# Patient Record
Sex: MALE | Race: OTHER | Employment: OTHER | URBAN - METROPOLITAN AREA
[De-identification: names, ages, dates, MRNs, and addresses within clinical notes are randomized per-mention and may not be internally consistent; named-entity substitution may affect disease eponyms.]

---

## 2019-05-13 ENCOUNTER — OFFICE VISIT (OUTPATIENT)
Dept: FAMILY MEDICINE CLINIC | Age: 49
End: 2019-05-13

## 2019-05-13 VITALS
OXYGEN SATURATION: 97 % | BODY MASS INDEX: 29.99 KG/M2 | HEART RATE: 73 BPM | TEMPERATURE: 97.3 F | WEIGHT: 180 LBS | DIASTOLIC BLOOD PRESSURE: 70 MMHG | HEIGHT: 65 IN | SYSTOLIC BLOOD PRESSURE: 124 MMHG

## 2019-05-13 DIAGNOSIS — J02.9 ACUTE PHARYNGITIS, UNSPECIFIED ETIOLOGY: Primary | ICD-10-CM

## 2019-05-13 PROCEDURE — 99213 OFFICE O/P EST LOW 20 MIN: CPT | Performed by: FAMILY MEDICINE

## 2019-05-13 RX ORDER — AZITHROMYCIN 250 MG/1
TABLET, FILM COATED ORAL
Qty: 6 TABLET | Refills: 0 | Status: SHIPPED | OUTPATIENT
Start: 2019-05-13 | End: 2019-05-18

## 2019-05-13 RX ORDER — METHYLPREDNISOLONE 4 MG/1
TABLET ORAL
Qty: 1 KIT | Refills: 0 | Status: SHIPPED | OUTPATIENT
Start: 2019-05-13 | End: 2019-05-19
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
## 2020-10-28 ENCOUNTER — OFFICE VISIT (OUTPATIENT)
Dept: FAMILY MEDICINE CLINIC | Age: 50
End: 2020-10-28
Payer: MEDICAID

## 2020-10-28 VITALS
WEIGHT: 182 LBS | RESPIRATION RATE: 18 BRPM | TEMPERATURE: 97.5 F | OXYGEN SATURATION: 99 % | HEART RATE: 75 BPM | DIASTOLIC BLOOD PRESSURE: 82 MMHG | SYSTOLIC BLOOD PRESSURE: 134 MMHG | BODY MASS INDEX: 30.32 KG/M2 | HEIGHT: 65 IN

## 2020-10-28 PROCEDURE — 1036F TOBACCO NON-USER: CPT | Performed by: PHYSICIAN ASSISTANT

## 2020-10-28 PROCEDURE — G8427 DOCREV CUR MEDS BY ELIG CLIN: HCPCS | Performed by: PHYSICIAN ASSISTANT

## 2020-10-28 PROCEDURE — G8484 FLU IMMUNIZE NO ADMIN: HCPCS | Performed by: PHYSICIAN ASSISTANT

## 2020-10-28 PROCEDURE — 99213 OFFICE O/P EST LOW 20 MIN: CPT | Performed by: PHYSICIAN ASSISTANT

## 2020-10-28 PROCEDURE — G8417 CALC BMI ABV UP PARAM F/U: HCPCS | Performed by: PHYSICIAN ASSISTANT

## 2020-10-28 RX ORDER — CHLORPHENIRAMINE MALEATE 4 MG/1
4 TABLET ORAL EVERY 6 HOURS PRN
Qty: 20 TABLET | Refills: 0 | Status: SHIPPED
Start: 2020-10-28 | End: 2021-05-13 | Stop reason: ALTCHOICE

## 2020-10-28 RX ORDER — METHYLPREDNISOLONE 4 MG/1
TABLET ORAL
Qty: 1 KIT | Refills: 0 | Status: SHIPPED
Start: 2020-10-28 | End: 2021-05-13 | Stop reason: ALTCHOICE

## 2020-10-28 RX ORDER — AZITHROMYCIN 250 MG/1
250 TABLET, FILM COATED ORAL SEE ADMIN INSTRUCTIONS
Qty: 6 TABLET | Refills: 0 | Status: SHIPPED | OUTPATIENT
Start: 2020-10-28 | End: 2020-11-02

## 2020-10-28 NOTE — PROGRESS NOTES
10/28/20  Paulie Peña : 1970 Sex: male  Age 52 y.o. Subjective:  Chief Complaint   Patient presents with    Cough     cough and congestion for 4 days          HPI:   Davina Beth , 52 y.o. male presents to express care for evaluation of cough, congestion, drainage. The patient has had these symptoms ongoing for last 4 days. The patient is not having any chest pain, shortness of breath, back pain or flank pain. Patient does not really have any significant sputum production. The patient states that he was taking some DayQuil with minimal improvement of the symptoms. The patient has not had any chest pain, shortness of breath. The patient denies any lack of smell or taste. No fevers. He lives alone and no other sick contacts at home. ROS:   Unless otherwise stated in this report the patient's positive and negative responses for review of systems for constitutional, eyes, ENT, cardiovascular, respiratory, gastrointestinal, neurological, , musculoskeletal, and integument systems and related systems to the presenting problem are either stated in the history of present illness or were not pertinent or were negative for the symptoms and/or complaints related to the presenting medical problem. Positives and pertinent negatives as per HPI. All others reviewed and are negative. PMH:   No past medical history on file. No past surgical history on file. No family history on file. Medications:   No current outpatient medications on file. Allergies: Allergies   Allergen Reactions    Percocet [Oxycodone-Acetaminophen] Itching and Nausea Only       Social History:     Social History     Tobacco Use    Smoking status: Never Smoker    Smokeless tobacco: Never Used   Substance Use Topics    Alcohol use: No    Drug use: No       Patient lives at home.     Physical Exam:     Vitals:    10/28/20 1110   BP: 134/82   Pulse: 75   Resp: 18   Temp: 97.5 °F (36.4 °C)   SpO2: 99%   Weight: 182 lb (82.6 kg)   Height: 5' 5\" (1.651 m)       Exam:  Physical Exam  Nurse's notes and vital signs reviewed. The patient is not hypoxic. ? General: Alert, no acute distress, patient resting comfortably Patient is not toxic or lethargic. Skin: Warm, intact, no pallor noted. There is no evidence of rash at this time. Head: Normocephalic, atraumatic  Eye: Normal conjunctiva  Ears, Nose, Throat: Right tympanic membrane clear, left tympanic membrane clear. No drainage or discharge noted. No pre- or post-auricular tenderness, erythema, or swelling noted. Nasal congestion and rhinorrhea  Posterior oropharynx shows erythema but no tonsillar hypertrophy, or exudate. the uvula is midline. No trismus or drooling is noted. Moist mucous membranes. Neck: No anterior/posterior lymphadenopathy noted. No erythema, no masses, no fluctuance or induration noted. No meningeal signs. Cardiovascular: Regular Rate and Rhythm  Respiratory: No acute distress, no rhonchi, wheezing or crackles noted. No stridor or retractions are noted. Neurological: A&O x4, normal speech  Psychiatric: Cooperative         Testing:           Medical Decision Making:     The patient has been seen and evaluated. The vital signs have been reviewed. The patient does not appear to be toxic or lethargic. The patient will be treated with a azithromycin, chlorphentermine and Medrol Dosepak. The patient was educated on the proper dosage of motrin and tylenol and the appropriate intervals of each. The patient is to increase fluid intake over the next several days. The patient is to use OTC nasal spray    The patient is to return to express care or go directly to the emergency department should any of the signs or symptoms worsen. The patient is to followup with primary care physician in 2-3 days for repeat evaluation. The patient has no other questions or concerns at this time the patient will be discharged home.           Clinical Impression:   Rodolfo Arellano was seen today for cough. Diagnoses and all orders for this visit:    Acute non-recurrent sinusitis, unspecified location  -     azithromycin (ZITHROMAX) 250 MG tablet; Take 1 tablet by mouth See Admin Instructions for 5 days 500mg on day 1 followed by 250mg on days 2 - 5    Acute upper respiratory infection, unspecified    Cough    Nasal congestion    Other orders  -     methylPREDNISolone (MEDROL DOSEPACK) 4 MG tablet; Take by mouth.  -     chlorpheniramine (ALLER-CHLOR) 4 MG tablet; Take 1 tablet by mouth every 6 hours as needed for Allergies        The patient is to call for any concerns or return if any of the signs or symptoms worsen. The patient is to follow-up with PCP in the next 2-3 days for repeat evaluation repeat assessment or go directly to the emergency department.      SIGNATURE: Brett Winkler III, PA-C

## 2021-05-13 ENCOUNTER — OFFICE VISIT (OUTPATIENT)
Dept: FAMILY MEDICINE CLINIC | Age: 51
End: 2021-05-13
Payer: MEDICAID

## 2021-05-13 VITALS
DIASTOLIC BLOOD PRESSURE: 82 MMHG | BODY MASS INDEX: 33.31 KG/M2 | HEART RATE: 63 BPM | HEIGHT: 62 IN | SYSTOLIC BLOOD PRESSURE: 132 MMHG | RESPIRATION RATE: 18 BRPM | TEMPERATURE: 97.5 F | OXYGEN SATURATION: 98 % | WEIGHT: 181 LBS

## 2021-05-13 DIAGNOSIS — R07.0 PAIN IN THROAT: ICD-10-CM

## 2021-05-13 DIAGNOSIS — R09.82 POSTNASAL DRIP: ICD-10-CM

## 2021-05-13 DIAGNOSIS — H66.92 LEFT OTITIS MEDIA, UNSPECIFIED OTITIS MEDIA TYPE: ICD-10-CM

## 2021-05-13 DIAGNOSIS — J01.90 ACUTE NON-RECURRENT SINUSITIS, UNSPECIFIED LOCATION: Primary | ICD-10-CM

## 2021-05-13 PROCEDURE — G8417 CALC BMI ABV UP PARAM F/U: HCPCS | Performed by: PHYSICIAN ASSISTANT

## 2021-05-13 PROCEDURE — 99213 OFFICE O/P EST LOW 20 MIN: CPT | Performed by: PHYSICIAN ASSISTANT

## 2021-05-13 PROCEDURE — G8427 DOCREV CUR MEDS BY ELIG CLIN: HCPCS | Performed by: PHYSICIAN ASSISTANT

## 2021-05-13 PROCEDURE — 1036F TOBACCO NON-USER: CPT | Performed by: PHYSICIAN ASSISTANT

## 2021-05-13 RX ORDER — PREDNISONE 10 MG/1
TABLET ORAL
Qty: 18 TABLET | Refills: 0 | Status: SHIPPED
Start: 2021-05-13 | End: 2021-06-25 | Stop reason: ALTCHOICE

## 2021-05-13 RX ORDER — AMOXICILLIN 875 MG/1
875 TABLET, COATED ORAL 2 TIMES DAILY
Qty: 20 TABLET | Refills: 0 | Status: SHIPPED | OUTPATIENT
Start: 2021-05-13 | End: 2021-05-23

## 2021-05-13 NOTE — PROGRESS NOTES
21  Paulie Peña : 1970 Sex: male  Age 54 y.o. Subjective:  Chief Complaint   Patient presents with    Other     pt states sore throat and ear after eating chicken last night          HPI:   Emilie Negrete , 54 y.o. male presents to express care for evaluation of sore throat, left ear pain. The patient started with the symptoms essentially last night after eating some chicken and had a sauce on it. The patient states that this not uncommon to have some kind of allergic reaction to different types of sauce. The patient cannot explain the ear pain. The patient states that he has had some congestion. The patient did not have any fevers. No right ear pain. No syncope. The patient has no pulsatile sensation. The patient denies any other complaints currently. ROS:   Unless otherwise stated in this report the patient's positive and negative responses for review of systems for constitutional, eyes, ENT, cardiovascular, respiratory, gastrointestinal, neurological, , musculoskeletal, and integument systems and related systems to the presenting problem are either stated in the history of present illness or were not pertinent or were negative for the symptoms and/or complaints related to the presenting medical problem. Positives and pertinent negatives as per HPI. All others reviewed and are negative. PMH:   History reviewed. No pertinent past medical history. History reviewed. No pertinent surgical history. History reviewed. No pertinent family history. Medications:     Current Outpatient Medications:     amoxicillin (AMOXIL) 875 MG tablet, Take 1 tablet by mouth 2 times daily for 10 days, Disp: 20 tablet, Rfl: 0    predniSONE (DELTASONE) 10 MG tablet, 3 tabs once daily for 3 days, 2 tabs once daily for 3 days, 1 tab once daily for 3 days, Disp: 18 tablet, Rfl: 0    Allergies:      Allergies   Allergen Reactions    Percocet [Oxycodone-Acetaminophen] Itching and

## 2021-06-25 ENCOUNTER — OFFICE VISIT (OUTPATIENT)
Dept: FAMILY MEDICINE CLINIC | Age: 51
End: 2021-06-25
Payer: MEDICAID

## 2021-06-25 VITALS
DIASTOLIC BLOOD PRESSURE: 76 MMHG | TEMPERATURE: 97.7 F | RESPIRATION RATE: 16 BRPM | HEIGHT: 65 IN | WEIGHT: 181 LBS | SYSTOLIC BLOOD PRESSURE: 126 MMHG | OXYGEN SATURATION: 99 % | BODY MASS INDEX: 30.16 KG/M2 | HEART RATE: 94 BPM

## 2021-06-25 DIAGNOSIS — H66.92 LEFT OTITIS MEDIA, UNSPECIFIED OTITIS MEDIA TYPE: Primary | ICD-10-CM

## 2021-06-25 DIAGNOSIS — R59.0 CERVICAL LYMPHADENOPATHY: ICD-10-CM

## 2021-06-25 PROCEDURE — 1036F TOBACCO NON-USER: CPT | Performed by: PHYSICIAN ASSISTANT

## 2021-06-25 PROCEDURE — 99213 OFFICE O/P EST LOW 20 MIN: CPT | Performed by: PHYSICIAN ASSISTANT

## 2021-06-25 PROCEDURE — G8427 DOCREV CUR MEDS BY ELIG CLIN: HCPCS | Performed by: PHYSICIAN ASSISTANT

## 2021-06-25 PROCEDURE — G8417 CALC BMI ABV UP PARAM F/U: HCPCS | Performed by: PHYSICIAN ASSISTANT

## 2021-06-25 RX ORDER — CEFDINIR 300 MG/1
300 CAPSULE ORAL 2 TIMES DAILY
Qty: 20 CAPSULE | Refills: 0 | Status: SHIPPED
Start: 2021-06-25 | End: 2021-07-02

## 2021-06-25 RX ORDER — PREDNISONE 10 MG/1
TABLET ORAL
Qty: 18 TABLET | Refills: 0 | Status: SHIPPED
Start: 2021-06-25 | End: 2021-07-02

## 2021-06-25 NOTE — PROGRESS NOTES
Social History:     Social History     Tobacco Use    Smoking status: Never Smoker    Smokeless tobacco: Never Used   Substance Use Topics    Alcohol use: No    Drug use: No       Patient lives at home. Physical Exam:     Vitals:    06/25/21 1408   BP: 126/76   Pulse: 94   Resp: 16   Temp: 97.7 °F (36.5 °C)   SpO2: 99%   Weight: 181 lb (82.1 kg)   Height: 5' 5\" (1.651 m)       Exam:  Physical Exam  Nurse's notes and vital signs reviewed. The patient is not hypoxic. ? General: Alert, no acute distress, patient resting comfortably Patient is not toxic or lethargic. Skin: Warm, intact, no pallor noted. There is no evidence of rash at this time. Head: Normocephalic, atraumatic  Eye: Normal conjunctiva  Ears, Nose, Throat: Right tympanic membrane clear, left tympanic membrane erythematous and bulging. No drainage or discharge noted. No pre- or post-auricular tenderness, erythema, or swelling noted. Slight congestion  Posterior oropharynx shows no erythema, tonsillar hypertrophy, or exudate. the uvula is midline. No trismus or drooling is noted. Moist mucous membranes. Neck: Mild left anterior lymphadenopathy. No erythema, no masses, no fluctuance or induration noted. No meningeal signs. Cardiovascular: Regular Rate and Rhythm  Respiratory: No acute distress, no rhonchi, wheezing or crackles noted. No stridor or retractions are noted. Neurological: A&O x4, normal speech  Psychiatric: Cooperative         Testing:           Medical Decision Making:     Vital signs reviewed    Past medical history reviewed. Allergies reviewed. Medications reviewed. Patient on arrival does not appear to be in any apparent distress or discomfort. The patient has been seen and evaluated. The patient does not appear to be toxic or lethargic. The patient will be treated with prednisone and Omnicef. The patient was educated on the proper dosage of motrin and tylenol and the appropriate intervals of each.  The patient is to increase fluid intake over the next several days. The patient is to use OTC decongestant as needed. The patient is to return to express care or go directly to the emergency department should any of the signs or symptoms worsen. The patient is to followup with primary care physician in 2-3 days for repeat evaluation. The patient has no other questions or concerns at this time the patient will be discharged home. Clinical Impression:   Sigrid Bonilla was seen today for ear problem. Diagnoses and all orders for this visit:    Left otitis media, unspecified otitis media type    Cervical lymphadenopathy    Other orders  -     predniSONE (DELTASONE) 10 MG tablet; 3 tabs once daily for 3 days, 2 tabs once daily for 3 days, 1 tab once daily for 3 days  -     cefdinir (OMNICEF) 300 MG capsule; Take 1 capsule by mouth 2 times daily for 10 days        The patient is to call for any concerns or return if any of the signs or symptoms worsen. The patient is to follow-up with PCP in the next 2-3 days for repeat evaluation repeat assessment or go directly to the emergency department.      SIGNATURE: Moira Strickland III, PA-C

## 2021-07-02 ENCOUNTER — OFFICE VISIT (OUTPATIENT)
Dept: FAMILY MEDICINE CLINIC | Age: 51
End: 2021-07-02
Payer: MEDICAID

## 2021-07-02 ENCOUNTER — APPOINTMENT (OUTPATIENT)
Dept: CT IMAGING | Age: 51
End: 2021-07-02
Payer: MEDICAID

## 2021-07-02 ENCOUNTER — HOSPITAL ENCOUNTER (EMERGENCY)
Age: 51
Discharge: HOME | End: 2021-07-02
Attending: EMERGENCY MEDICINE
Payer: MEDICAID

## 2021-07-02 VITALS
OXYGEN SATURATION: 99 % | HEART RATE: 80 BPM | HEIGHT: 65 IN | TEMPERATURE: 97.3 F | DIASTOLIC BLOOD PRESSURE: 83 MMHG | WEIGHT: 185 LBS | SYSTOLIC BLOOD PRESSURE: 124 MMHG | RESPIRATION RATE: 14 BRPM | BODY MASS INDEX: 30.82 KG/M2

## 2021-07-02 VITALS
DIASTOLIC BLOOD PRESSURE: 66 MMHG | OXYGEN SATURATION: 99 % | BODY MASS INDEX: 30.45 KG/M2 | TEMPERATURE: 96.8 F | SYSTOLIC BLOOD PRESSURE: 128 MMHG | WEIGHT: 183 LBS | HEART RATE: 72 BPM

## 2021-07-02 DIAGNOSIS — R22.1 LOCALIZED SWELLING, MASS AND LUMP, NECK: Primary | ICD-10-CM

## 2021-07-02 DIAGNOSIS — M54.2 NECK PAIN ON LEFT SIDE: Primary | ICD-10-CM

## 2021-07-02 LAB
ANION GAP SERPL CALCULATED.3IONS-SCNC: 10 MMOL/L (ref 7–16)
BASOPHILS ABSOLUTE: 0.02 E9/L (ref 0–0.2)
BASOPHILS RELATIVE PERCENT: 0.2 % (ref 0–2)
BUN BLDV-MCNC: 12 MG/DL (ref 6–20)
CALCIUM SERPL-MCNC: 9.5 MG/DL (ref 8.6–10.2)
CHLORIDE BLD-SCNC: 107 MMOL/L (ref 98–107)
CO2: 22 MMOL/L (ref 22–29)
CREAT SERPL-MCNC: 1.1 MG/DL (ref 0.7–1.2)
EOSINOPHILS ABSOLUTE: 0.04 E9/L (ref 0.05–0.5)
EOSINOPHILS RELATIVE PERCENT: 0.3 % (ref 0–6)
GFR AFRICAN AMERICAN: >60
GFR NON-AFRICAN AMERICAN: >60 ML/MIN/1.73
GLUCOSE BLD-MCNC: 100 MG/DL (ref 74–99)
HCT VFR BLD CALC: 41 % (ref 37–54)
HEMOGLOBIN: 13.9 G/DL (ref 12.5–16.5)
IMMATURE GRANULOCYTES #: 0.07 E9/L
IMMATURE GRANULOCYTES %: 0.6 % (ref 0–5)
LYMPHOCYTES ABSOLUTE: 3.43 E9/L (ref 1.5–4)
LYMPHOCYTES RELATIVE PERCENT: 28.7 % (ref 20–42)
MCH RBC QN AUTO: 29.1 PG (ref 26–35)
MCHC RBC AUTO-ENTMCNC: 33.9 % (ref 32–34.5)
MCV RBC AUTO: 86 FL (ref 80–99.9)
MONO TEST: NEGATIVE
MONOCYTES ABSOLUTE: 0.8 E9/L (ref 0.1–0.95)
MONOCYTES RELATIVE PERCENT: 6.7 % (ref 2–12)
NEUTROPHILS ABSOLUTE: 7.58 E9/L (ref 1.8–7.3)
NEUTROPHILS RELATIVE PERCENT: 63.5 % (ref 43–80)
PDW BLD-RTO: 13.2 FL (ref 11.5–15)
PLATELET # BLD: 221 E9/L (ref 130–450)
PMV BLD AUTO: 11.2 FL (ref 7–12)
POTASSIUM REFLEX MAGNESIUM: 3.7 MMOL/L (ref 3.5–5)
RBC # BLD: 4.77 E12/L (ref 3.8–5.8)
SODIUM BLD-SCNC: 139 MMOL/L (ref 132–146)
WBC # BLD: 11.9 E9/L (ref 4.5–11.5)

## 2021-07-02 PROCEDURE — 80048 BASIC METABOLIC PNL TOTAL CA: CPT

## 2021-07-02 PROCEDURE — 6360000004 HC RX CONTRAST MEDICATION: Performed by: RADIOLOGY

## 2021-07-02 PROCEDURE — 70491 CT SOFT TISSUE NECK W/DYE: CPT

## 2021-07-02 PROCEDURE — 99284 EMERGENCY DEPT VISIT MOD MDM: CPT

## 2021-07-02 PROCEDURE — 99213 OFFICE O/P EST LOW 20 MIN: CPT | Performed by: PHYSICIAN ASSISTANT

## 2021-07-02 PROCEDURE — 2580000003 HC RX 258: Performed by: NURSE PRACTITIONER

## 2021-07-02 PROCEDURE — 1036F TOBACCO NON-USER: CPT | Performed by: PHYSICIAN ASSISTANT

## 2021-07-02 PROCEDURE — G8417 CALC BMI ABV UP PARAM F/U: HCPCS | Performed by: PHYSICIAN ASSISTANT

## 2021-07-02 PROCEDURE — 86308 HETEROPHILE ANTIBODY SCREEN: CPT

## 2021-07-02 PROCEDURE — G8427 DOCREV CUR MEDS BY ELIG CLIN: HCPCS | Performed by: PHYSICIAN ASSISTANT

## 2021-07-02 PROCEDURE — 85025 COMPLETE CBC W/AUTO DIFF WBC: CPT

## 2021-07-02 RX ORDER — 0.9 % SODIUM CHLORIDE 0.9 %
1000 INTRAVENOUS SOLUTION INTRAVENOUS ONCE
Status: COMPLETED | OUTPATIENT
Start: 2021-07-02 | End: 2021-07-02

## 2021-07-02 RX ADMIN — IOPAMIDOL 75 ML: 755 INJECTION, SOLUTION INTRAVENOUS at 21:23

## 2021-07-02 RX ADMIN — SODIUM CHLORIDE 1000 ML: 9 INJECTION, SOLUTION INTRAVENOUS at 21:30

## 2021-07-02 ASSESSMENT — PAIN SCALES - GENERAL: PAINLEVEL_OUTOF10: 1

## 2021-07-02 NOTE — PROGRESS NOTES
21  Paulie Peña : 1970 Sex: male  Age 54 y.o. Subjective:  Chief Complaint   Patient presents with    Otalgia     L side          HPI:   Larry , 54 y.o. male presents to express care for evaluation of left-sided neck pain, swelling. The patient states that the left neck pain in swelling started essentially last night. The patient was seen evaluate about a week ago and was placed on cefdinir and prednisone. He finished those up today. The patient states that last night he started noticing some pain on the left side of the neck. He does not know if he slept wrong. The patient feels that there is some swelling in the area. He is having a hard time turning his neck. The patient is not having any right-sided pain. The patient states that he does have a little bit of nasal congestion rhinorrhea. ROS:   Unless otherwise stated in this report the patient's positive and negative responses for review of systems for constitutional, eyes, ENT, cardiovascular, respiratory, gastrointestinal, neurological, , musculoskeletal, and integument systems and related systems to the presenting problem are either stated in the history of present illness or were not pertinent or were negative for the symptoms and/or complaints related to the presenting medical problem. Positives and pertinent negatives as per HPI. All others reviewed and are negative. PMH:   No past medical history on file. No past surgical history on file. No family history on file. Medications:   No current outpatient medications on file. Allergies: Allergies   Allergen Reactions    Percocet [Oxycodone-Acetaminophen] Itching and Nausea Only       Social History:     Social History     Tobacco Use    Smoking status: Never Smoker    Smokeless tobacco: Never Used   Substance Use Topics    Alcohol use: No    Drug use: No       Patient lives at home.     Physical Exam:     Vitals:    21 1755   BP: 128/66   Pulse: 72   Temp: 96.8 °F (36 °C)   SpO2: 99%   Weight: 183 lb (83 kg)       Exam:  Physical Exam  Nurse's notes and vital signs reviewed. The patient is not hypoxic. General: Alert, no acute distress, patient resting comfortably Patient is not toxic or lethargic. Skin: Warm, intact, no pallor noted. There is no evidence of rash at this time. Head: Normocephalic, atraumatic. Eye: Normal conjunctiva  Ears, Nose, Throat: Right tympanic membrane clear, left tympanic membrane clear. No drainage or discharge noted. No pre- or post-auricular tenderness, erythema, or swelling noted. Minimal congestion, no significant rhinorrhea. No facial erythema. Posterior oropharynx shows no erythema, tonsillar hypertrophy, asymmetry or peritonsillar abscess and no evidence of exudate. the uvula is midline. No trismus or drooling is noted. Moist mucous membranes. Neck: The patient does have diffuse tenderness noted to the lateral aspect of the neck just to the inferior portion of the ear. The patient had no step-offs or crepitus noted. The patient had no pulsatile mass. There is no significant erythema noted to the neck area. Cardio: Regular Rate and Rhythm  Respiratory: No acute distress, no rhonchi, wheezing or crackles noted. No stridor or retractions are noted. Abdomen: Normal bowel sounds, soft, nontender, no masses detected. No rebound, guarding, or rigidity noted. Musculoskeletal: No obvious deformity, no edema, no calf tenderness. No erythema. Neurological: A&O x4, normal speech  Psychiatric: Cooperative         Testing:           Medical Decision Making:     Vital signs reviewed    Past medical history reviewed. Allergies reviewed. Medications reviewed. Patient on arrival does not appear to be in any apparent distress or discomfort. The patient has been seen and evaluated. The patient does not appear to be toxic or lethargic.      The patient's previous erythema noted to the tympanic membrane is resolved. The patient has very minimal congestion. The patient has some swelling to the left side of the neck. I cannot rule out further pathology including deep space infection. I would recommend the patient go to the emergency department to have laboratory studies performed and CT of the soft tissue of the neck. The patient understands the concerns and the limitations of express. The patient will go directly there for further evaluation. Clinical Impression:   Denny Antonio was seen today for otalgia. Diagnoses and all orders for this visit:    Localized swelling, mass and lump, neck        go directly to the emergency department.      SIGNATURE: Sherren Ball III, PA-C

## 2021-07-02 NOTE — ED PROVIDER NOTES
ED Attending  CC: Mame         [facility]  Department of Emergency Medicine   ED  Encounter Note  Admit Date/RoomTime: 2021  6:42 PM  ED Room: /    NAME: Teodora Durant Sr.  : 1970  MRN: [id number]     Chief Complaint:  Neck Pain (Swollen Lymph Node in Neck; Left)    History of Present Illness       Paulie Peña Sr. is a 54 y.o. old male who presents to the emergency department by private vehicle, for nasal congestion and swollen lymph node of the left neck, which began 1 day(s) prior to arrival.  Since onset the symptoms have been remaining constant and mild in severity. The symptoms are associated with no additional symptoms as it relates to today's visit. There has been no abdominal pain, decreased appetite, chest tightness, conjunctivitis, watery eyes, dry cough, productive cough, nausea, vomiting, diarrhea, dizziness, dysuria, urinary frequency, fever, fatigue, headache, hoarseness, irritability, joint swelling, malaise, muscle aches, neck stiffness, rash, sore throat, scratchy throat, wheezing, loss of taste or loss of smell. Patient was seen at urgent care on 2021. He was diagnosed with a left otitis media. He was placed on cefdinir and prednisone. He states he continues has nasal congestion and last night felt a swollen lymph node. He denies any fevers or chills. No difficulty swallowing. Denies any difficulties maintaining his own secretions. Denies any difficulties opening his mouth wide. ROS   Pertinent positives and negatives are stated within HPI, all other systems reviewed and are negative. Past Medical History:  has no past medical history on file. Surgical History:  has no past surgical history on file. Social History:  reports that he has never smoked. He has never used smokeless tobacco. He reports that he does not drink alcohol and does not use drugs. Family History: family history is not on file.      Allergies: Percocet [oxycodone-acetaminophen]    Physical Exam   Oxygen Saturation Interpretation: Normal on room air analysis. ED Triage Vitals   BP Temp Temp Source Pulse Resp SpO2 Height Weight   07/02/21 1840 07/02/21 1840 07/02/21 1840 07/02/21 1840 07/02/21 1836 07/02/21 1840 07/02/21 1836 07/02/21 1836   124/83 97.3 °F (36.3 °C) Oral 80 14 99 % 5' 5\" (1.651 m) 185 lb (83.9 kg)         · Constitutional:  Alert, development consistent with age. · Ears:  External Ears: Bilateral normal.               TM's & External Canals: normal appearance. · Nose:   There is clear rhinorrhea. · Sinuses: no Bilateral maxillary sinus tenderness. no Bilateral frontal sinus tenderness. · Mouth:  normal tongue and buccal mucosa. No trismus  · Throat: no erythema or exudates noted. Teeth and gums normal..  Airway Patent. Uvula is midline  · Neck:  Supple. There is Left  posterior cervical node tenderness. No warmth or erythema or swelling noted  · Respiratory:   Breath sounds: Bilateral normal.  Lung sounds: normal.   · CV:  Regular rate and rhythm, normal heart sounds, without pathological murmurs, ectopy, gallops, or rubs. · GI:  Abdomen Soft, nontender, good bowel sounds. No firm or pulsatile mass. · Integument:  Normal turgor. Warm, dry, without visible rash. · Neurological:  Oriented. Motor functions intact.        Lab / Imaging Results   (All laboratory and radiology results have been personally reviewed by myself)  Labs:  Results for orders placed or performed during the hospital encounter of 07/02/21   Mononucleosis screen   Result Value Ref Range    Mono Test Negative Negative   CBC Auto Differential   Result Value Ref Range    WBC 11.9 (H) 4.5 - 11.5 E9/L    RBC 4.77 3.80 - 5.80 E12/L    Hemoglobin 13.9 12.5 - 16.5 g/dL    Hematocrit 41.0 37.0 - 54.0 %    MCV 86.0 80.0 - 99.9 fL    MCH 29.1 26.0 - 35.0 pg    MCHC 33.9 32.0 - 34.5 %    RDW 13.2 11.5 - 15.0 fL    Platelets 222 056 - 501 E9/L    MPV 11.2 7.0 - 12.0 fL    Neutrophils % 63.5 43.0 - 80.0 %    Immature Granulocytes % 0.6 0.0 - 5.0 %    Lymphocytes % 28.7 20.0 - 42.0 %    Monocytes % 6.7 2.0 - 12.0 %    Eosinophils % 0.3 0.0 - 6.0 %    Basophils % 0.2 0.0 - 2.0 %    Neutrophils Absolute 7.58 (H) 1.80 - 7.30 E9/L    Immature Granulocytes # 0.07 E9/L    Lymphocytes Absolute 3.43 1.50 - 4.00 E9/L    Monocytes Absolute 0.80 0.10 - 0.95 E9/L    Eosinophils Absolute 0.04 (L) 0.05 - 0.50 E9/L    Basophils Absolute 0.02 0.00 - 0.20 K1/B   Basic Metabolic Panel w/ Reflex to MG   Result Value Ref Range    Sodium 139 132 - 146 mmol/L    Potassium reflex Magnesium 3.7 3.5 - 5.0 mmol/L    Chloride 107 98 - 107 mmol/L    CO2 22 22 - 29 mmol/L    Anion Gap 10 7 - 16 mmol/L    Glucose 100 (H) 74 - 99 mg/dL    BUN 12 6 - 20 mg/dL    CREATININE 1.1 0.7 - 1.2 mg/dL    GFR Non-African American >60 >=60 mL/min/1.73    GFR African American >60     Calcium 9.5 8.6 - 10.2 mg/dL       Imaging: All Radiology results interpreted by Radiologist unless otherwise noted. CT SOFT TISSUE NECK W CONTRAST   Final Result   No acute abnormality of the soft tissue structures of the neck. ED Course / Medical Decision Making     Medications   0.9 % sodium chloride bolus (0 mLs Intravenous Stopped 7/2/21 2222)   iopamidol (ISOVUE-370) 76 % injection 75 mL (75 mLs Intravenous Given 7/2/21 2123)        Re-examination:  7/2/21       Time: 2059-Seun Chang at bedside for reexamination. Patient is in no distress. Consults:   None    Procedures:   none    Medical Decision Making:   Patient presented emerged apartment for left-sided neck pain that started 1 day ago. He had a otitis media that he was on cefdinir and prednisone for. He denies any shortness of breath, chest pain, lightness, dizziness, sore throat, dental pain. He states he has nasal congestion. His exam was benign. There is no warmth or erythema. Patient has tenderness to the posterior cervical lymph nodes.   Mono was negative. CBC was unremarkable. CMP was unremarkable. Patient receiving CT of the soft tissue neck with contrast.  Is pending at this time. Refer to Dr. Gabino Jensen addendum for appropriate disposition and reviewing additional results and evaluation. Plan of Care/Counseling:  CAROLE Liu CNP and EM Attending Physician reviewed today's visit with the patient in addition to providing specific details for the plan of care and counseling regarding the diagnosis and prognosis. Questions are answered at this time and are agreeable with the plan. Assessment     1. Neck pain on left side      Plan   Pending based on pending CT of the soft tissue neck  Patient condition is stable    New Medications     There are no discharge medications for this patient. Electronically signed by Orin Desai DO   DD: 7/2/21  **This report was transcribed using voice recognition software. Every effort was made to ensure accuracy; however, inadvertent computerized transcription errors may be present. END OF ED PROVIDER NOTE         CAROLE Liu CNP  07/02/21 [id number]    ATTENDING PROVIDER ATTESTATION:     I have personally performed and/or participated in the history, exam, medical decision making, and procedures and agree with all pertinent clinical information unless otherwise noted. I have also reviewed and agree with the past medical, family and social history unless otherwise noted. I have discussed this patient in detail with the resident and provided the instruction and education regarding the evidence-based evaluation and treatment of neck pain  My plan: Symptomatic and supportive care. Patient seen and examined. Labs and CT soft tissue neck were ordered for concern for possible lymph node enlargement. CT showed no concerning findings.   The rest of the patient's work-up was found to be reassuring is recommended to follow-up with ear nose and throat and discharged with outpatient follow-up.     Electronically signed by Emmett Guerrero DO on 7/2/21 at 11:59 PM EDT           Emmett Guerrero DO  07/03/21 0000

## 2021-07-02 NOTE — ED NOTES
FIRST PROVIDER CONTACT ASSESSMENT NOTE      Department of Emergency Medicine   7/2/21  6:43 PM EDT    Chief Complaint: Neck Pain (Swollen Lymph Node in Neck; Left)      History of Present Illness:   Lesly Dalton is a 54 y.o. male who presents to the ED for left-sided neck swollen lymph nodes. Started last night. He was seen at [facility] urgent care on June 25, 2021. He was diagnosed with left otitis media. He was given cefdinir for 10 days and prednisone. He states he continues to have nasal congestion. Denies any sore throat, throat swelling, shortness breath, chest pain. Speech is clear. Tolerating his own secretions. Medical History:  has no past medical history on file. Surgical History:  has no past surgical history on file. Social History:  reports that he has never smoked. He has never used smokeless tobacco. He reports that he does not drink alcohol and does not use drugs. Family History: family history is not on file.     *ALLERGIES*     Percocet [oxycodone-acetaminophen]     Physical Exam:      VS:  /83   Pulse 80   Temp 97.3 °F (36.3 °C) (Oral)   Resp 14   Ht 5' 5\" (1.651 m)   Wt 185 lb (83.9 kg)   SpO2 99%   BMI 30.79 kg/m²      Initial Plan of Care:  Initiate Treatment-Testing, Proceed toTreatment Area When Bed Available for ED Attending/MLP to Continue Care    -----------------END OF FIRST PROVIDER CONTACT ASSESSMENT NOTE--------------  Electronically signed by CAROLE Shea CNP   DD: 7/2/21             CAROLE Shea CNP  07/02/21 [id number]